# Patient Record
Sex: MALE | Race: WHITE | HISPANIC OR LATINO | Employment: UNEMPLOYED | URBAN - METROPOLITAN AREA
[De-identification: names, ages, dates, MRNs, and addresses within clinical notes are randomized per-mention and may not be internally consistent; named-entity substitution may affect disease eponyms.]

---

## 2024-02-27 NOTE — PROGRESS NOTES
EMG and Nerve Conduction Studies    Patient Name: Lev Ojeda    Date of Study:2/28/24    Referring Provider:ABIGAIL LUNA DPM    History:    This patient is a 38-year-old male.  He complains of chronic pain in his knees which is worse with walking and present throughout the day.  He complains of burning and discomfort in his feet which comes on when resting in the evening worse at bedtime associated with an urge to move with temporary relief of the symptoms with movement.  He denies any numbness or tingling or pain in the feet throughout the rest of the day.    Results:    The complete report includes the data sheets.   This test was undertaken with the aid of a .  The testing was explained to the patient and questions answered.    1.  The right sural sensory and the left medial plantar nerve conduction studies were normal.    2.  The right peroneal motor nerve conduction study was normal.    3.  Electromyography of the muscles examined in the right L3-S1 myotomes were normal.  As electromyography of the right lower extremity was normal testing of the left lower extremity was deferred.    Impression:    This is a normal study.  There is no evidence of sensorimotor neuropathy.  There is no evidence of neurogenic change in the muscles examined in the right L3-S1 myotomes.  The clinical history suggest an orthopedic problem with the knees.  The symptoms in the feet are consistent with the clinical syndrome of restless legs.      Electronically signed by :    Joseph Seipel, M.D.  February 28, 2024

## 2024-02-28 ENCOUNTER — PROCEDURE VISIT (OUTPATIENT)
Dept: NEUROLOGY | Facility: CLINIC | Age: 39
End: 2024-02-28

## 2024-02-28 VITALS
HEIGHT: 70 IN | WEIGHT: 140 LBS | BODY MASS INDEX: 20.04 KG/M2 | SYSTOLIC BLOOD PRESSURE: 121 MMHG | HEART RATE: 79 BPM | DIASTOLIC BLOOD PRESSURE: 78 MMHG

## 2024-02-28 DIAGNOSIS — M25.561 PAIN IN BOTH KNEES, UNSPECIFIED CHRONICITY: ICD-10-CM

## 2024-02-28 DIAGNOSIS — R20.2 PARESTHESIA OF BOTH FEET: Primary | ICD-10-CM

## 2024-02-28 DIAGNOSIS — G25.81 RESTLESS LEGS SYNDROME (RLS): ICD-10-CM

## 2024-02-28 DIAGNOSIS — M25.562 PAIN IN BOTH KNEES, UNSPECIFIED CHRONICITY: ICD-10-CM

## 2024-02-28 PROBLEM — E78.2 MIXED HYPERLIPIDEMIA: Status: ACTIVE | Noted: 2024-02-28
